# Patient Record
(demographics unavailable — no encounter records)

---
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
## 2025-07-21 NOTE — ASSESSMENT
[FreeTextEntry1] : .  Plan: - Patient has longstanding history of nasal obstruction with structural abnormalities that would benefit from revision septorhinoplasty and turbinate reduction. Agree with proceeding with nasal surgery as planned by Dr Gonzalez.  -- Brenda Gallegos MD Facial Plastic & Reconstructive Surgery
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
## 2025-07-21 NOTE — PROCEDURE
[FreeTextEntry3] : Nasal Endoscopy: Pre-operative diagnosis: nasal obstruction Indication: Anterior rhinoscopy insufficient to diagnose pathology Details: After decongestant and lidocaine was sprayed in the bilateral nasal cavities, a rigid endoscope was inserted into the right nares. The nasal cavity, middle meatus, and nasopharynx were visualized. The endoscope was then inserted into the left nares and an identical procedure was performed. The patient tolerated the procedure well. Findings: no masses or polyps, middle meatuses clear, notable bilateral thick mucus, severe leftward septal deviation with difficulty passing scope past this deviation

## 2025-07-21 NOTE — PHYSICAL EXAM
[TextEntry] : GEN: well nourished, well developed, no acute distress. HEAD: normocephalic, atraumatic FACE: symmetric and motion intact, Owens 6. Palpable bony asymmetry along left mandibular body compared to the right  EYES: EOMI, pupils symmetric, normal conjunctiva, vision grossly intact EXT NOSE:  thick skin, leftward bony dorsal deviation, notable liplift scar with asymmetry of nostril base width, midline nasal tip with good tip support  INT NOSE: Mucosa very boggy and inflamed,  leftward severe septum deviation with near complete occlusion of left nasal cavity, moderate-severe turbinate hypertrophy ORAL CAVITY: normal dentition with baseline occlusion, mucus membranes healthy SKIN: intact, warm, and dry NEURO: alert & oriented x4